# Patient Record
Sex: MALE | Race: WHITE
[De-identification: names, ages, dates, MRNs, and addresses within clinical notes are randomized per-mention and may not be internally consistent; named-entity substitution may affect disease eponyms.]

---

## 2018-05-07 ENCOUNTER — HOSPITAL ENCOUNTER (EMERGENCY)
Dept: HOSPITAL 17 - NEPE | Age: 68
LOS: 1 days | Discharge: HOME | End: 2018-05-08
Payer: COMMERCIAL

## 2018-05-07 VITALS — OXYGEN SATURATION: 96 % | RESPIRATION RATE: 18 BRPM | HEART RATE: 69 BPM

## 2018-05-07 VITALS
SYSTOLIC BLOOD PRESSURE: 222 MMHG | DIASTOLIC BLOOD PRESSURE: 102 MMHG | RESPIRATION RATE: 18 BRPM | TEMPERATURE: 98.3 F | HEART RATE: 72 BPM | OXYGEN SATURATION: 97 %

## 2018-05-07 VITALS — BODY MASS INDEX: 36.14 KG/M2 | WEIGHT: 224.87 LBS | HEIGHT: 66 IN

## 2018-05-07 DIAGNOSIS — Z79.899: ICD-10-CM

## 2018-05-07 DIAGNOSIS — E11.9: ICD-10-CM

## 2018-05-07 DIAGNOSIS — I48.91: ICD-10-CM

## 2018-05-07 DIAGNOSIS — E78.5: ICD-10-CM

## 2018-05-07 DIAGNOSIS — Z79.01: ICD-10-CM

## 2018-05-07 DIAGNOSIS — T38.3X1A: Primary | ICD-10-CM

## 2018-05-07 DIAGNOSIS — Z79.4: ICD-10-CM

## 2018-05-07 DIAGNOSIS — E78.00: ICD-10-CM

## 2018-05-07 DIAGNOSIS — E03.9: ICD-10-CM

## 2018-05-07 DIAGNOSIS — I10: ICD-10-CM

## 2018-05-07 LAB
BASOPHILS # BLD AUTO: 0 TH/MM3 (ref 0–0.2)
BASOPHILS NFR BLD: 0.8 % (ref 0–2)
EOSINOPHIL # BLD: 0.4 TH/MM3 (ref 0–0.4)
EOSINOPHIL NFR BLD: 6.3 % (ref 0–4)
ERYTHROCYTE [DISTWIDTH] IN BLOOD BY AUTOMATED COUNT: 15 % (ref 11.6–17.2)
HCT VFR BLD CALC: 42.4 % (ref 39–51)
HGB BLD-MCNC: 14.5 GM/DL (ref 13–17)
LYMPHOCYTES # BLD AUTO: 1.5 TH/MM3 (ref 1–4.8)
LYMPHOCYTES NFR BLD AUTO: 24.5 % (ref 9–44)
MCH RBC QN AUTO: 32.8 PG (ref 27–34)
MCHC RBC AUTO-ENTMCNC: 34.3 % (ref 32–36)
MCV RBC AUTO: 95.7 FL (ref 80–100)
MONOCYTE #: 0.6 TH/MM3 (ref 0–0.9)
MONOCYTES NFR BLD: 9.9 % (ref 0–8)
NEUTROPHILS # BLD AUTO: 3.5 TH/MM3 (ref 1.8–7.7)
NEUTROPHILS NFR BLD AUTO: 58.5 % (ref 16–70)
PLATELET # BLD: 168 TH/MM3 (ref 150–450)
PMV BLD AUTO: 10.6 FL (ref 7–11)
RBC # BLD AUTO: 4.43 MIL/MM3 (ref 4.5–5.9)
WBC # BLD AUTO: 6 TH/MM3 (ref 4–11)

## 2018-05-07 PROCEDURE — 99283 EMERGENCY DEPT VISIT LOW MDM: CPT

## 2018-05-07 PROCEDURE — 85025 COMPLETE CBC W/AUTO DIFF WBC: CPT

## 2018-05-07 PROCEDURE — 85610 PROTHROMBIN TIME: CPT

## 2018-05-07 PROCEDURE — 80048 BASIC METABOLIC PNL TOTAL CA: CPT

## 2018-05-07 NOTE — PD
HPI


Chief Complaint:  Diabetic


Time Seen by Provider:  23:20


Travel History


International Travel<30 days:  No


Contact w/Intl Traveler<30days:  No


Traveled to known affect area:  No





History of Present Illness


HPI


The patient is a 67 year old male who presents to the [facility] emergency 

department with a history of accidentally administering extra NovoLog this 

evening around 10 PM.  The patient reports that he last ate a meal for dinner 

at approximately 6:30 PM.  The patient reports that he was preparing to go to 

bed and took his nighttime oral medicines and was also in the process of 

administering his NovoLog and Lantus insulin.  He normally administers 45 units 

of Lantus at night and 9 units of NovoLog 3 times daily.  He reports that he 

mixed up the 2 bottles and accidentally administered 45 units of NovoLog.  The 

patient reports that his blood sugar then dropped down to 48.  He reports that 

he drank orange juice, 8 jellybeans, and on arrival to this facility was also 

provided a meal.  Since then his blood sugar has been maintained.  The patient 

denies having any other acute complaints.  The patient is anticoagulated on 

Coumadin for atrial fibrillation.  He denies having any problems with bleeding.

  His last INR was checked last week.  He reports that his dose was decreased, 

however he is unsure with INR was.  On review of systems otherwise he denies 

having any recent fevers, cough or congestion, neck pain, chest pain, shortness 

of breath, abdominal pain, vomiting, diarrhea, urinary symptoms, or neurologic 

symptoms.  The patient reports that when his blood sugar drops he does get 

jittery and sweaty.





[facility]


Past Medical History


*** Narrative Medical


The patient's past medical history is significant for diabetes mellitus, 

hypertension, hypothyroid disorder, hyperlipidemia, atrial fibrillation.


High Cholesterol:  Yes


Diabetes:  Yes


Patient Takes Glucophage:  No


Hypertension:  Yes





Past Surgical History


*** Narrative Surgical


The patient's past surgical history is significant for right third finger 

surgery, lumbar laminectomy, tonsillectomy, cholecystectomy.


Cholecystectomy:  Yes


Tonsillectomy:  Yes


Other Surgery:  Yes





Social History


Alcohol Use:  No


Tobacco Use:  No


Substance Use:  No





Allergies-Medications


(Allergen,Severity, Reaction):  


Coded Allergies:  


     No Known Allergies (Unverified  Adverse Reaction, Unknown, 5/7/18)


Reported Meds & Prescriptions





Reported Meds & Active Scripts


Active


Reported


Metoprolol Tartrate 50 Mg Tab 50 Mg PO BID


Fenofibrate 160 Mg Tab 160 Mg PO HS


Amlodipine (Amlodipine Besylate) 5 Mg Tab 5 Mg PO DAILY


Warfarin 5 Mg Tab 5 Mg PO DAILY


Rosuvastatin (Rosuvastatin Calcium) 20 Mg Tab 20 Mg PO HS


Paroxetine (Paroxetine HCl) 30 Mg Tab 30 Mg PO DAILY


Clonidine (Clonidine HCl) 0.1 Mg Tab 0.1 Mg PO BID


Levothyroxine (Levothyroxine Sodium) 175 Mcg Tab 175 Mcg PO DAILY


Losartan-Hydrochlorothiazide 100-25 Mg Tab 1 Tab PO DAILY


Novolog Inj (Insulin Aspart) 1,000 Unit/10 Ml Vial 9 Units SQ TIDAC


Lantus Inj (Insulin Glargine) 1,000 Unit/10 Ml Vial 45 Units SQ HS








Review of Systems


Except as stated in HPI:  all other systems reviewed are Neg


General / Constitutional:  No: Fever


Eyes:  No: Visual changes


HENT:  No: Headaches


Cardiovascular:  No: Chest Pain or Discomfort


Respiratory:  No: Shortness of Breath


Gastrointestinal:  No: Abdominal Pain


Genitourinary:  No: Dysuria


Musculoskeletal:  No: Pain


Skin:  No Rash


Neurologic:  No: Weakness


Psychiatric:  No: Depression


Endocrine:  No: Polydipsia


Hematologic/Lymphatic:  No: Easy Bruising





Physical Exam


Narrative


General: 


The patient is a well-developed well-nourished male in no acute distress. 





Head and Neck exam: 


Head is normocephalic atraumatic. 


Eyes: EOMI, pupils are equal round and reactive to light. 


Nose: Midline septum with pink mucous membranes 


Mouth: Dentition unremarkable. Moist mucus membranes. Posterior oropharynx is 

not erythematous. No tonsillar hypertrophy. Uvula midline. Airway patent. 


Neck: No palpable lymphadenopathy. No nuchal rigidity. No thyromegaly. 





Cardiovascular: 


Irregularly irregular with rate control in the 70s without murmurs, gallops, or 

rubs. 





Lungs: 


Clear to auscultation bilaterally. No wheezes, rhonchi, or rales.


 


Abdomen:


Soft, without tenderness to palpation in all 4 quadrants of the abdomen. No 

guarding, rebound, or rigidity.  Normal bowel sounds are audible.  No 

tenderness on palpation of McBurney's point.





Extremities: 


No clubbing, cyanosis, or edema. 2+ pulses in all 4 extremities.  No calf 

tenderness on palpation peer





Back: 


 No costovertebral angle tenderness to palpation. 





Neurologic Exam: Grossly nonfocal.





Skin Exam: No rash noted. Intact skin that is warm and dry.





Data


Data


Last Documented VS





Vital Signs








  Date Time  Temp Pulse Resp B/P (MAP) Pulse Ox O2 Delivery O2 Flow Rate FiO2


 


5/8/18 03:00  55 18 130/75 (93) 96 Room Air  


 


5/7/18 23:08 98.3       








Orders





 Orders


Complete Blood Count With Diff (5/7/18 23:20)


Basic Metabolic Panel (Bmp) (5/7/18 23:20)


Iv Access Insert/Monitor (5/7/18 23:20)


Ecg Monitoring (5/7/18 23:20)


Oximetry (5/7/18 23:20)


Diet As Tolerated (5/7/18 23:20)


Bedside Glucose Q15M (5/7/18 23:20)


Hypoglycemia 70 Mg/Dl Or < (5/7/18 23:20)


Dextrose 50% In Marquez (Vial) Inj (D50w (Vi (5/7/18 23:30)


Glucagon Inj (Glucagon Inj) (5/7/18 23:30)


Glucose, Random (5/7/18 23:20)


Prothrombin Time / Inr (Pt) (5/8/18 00:15)


Bedside Glucose FLOR.CSUGAR (5/8/18 03:13)





Labs





Laboratory Tests








Test


  5/7/18


23:40 5/8/18


00:10


 


White Blood Count 6.0 TH/MM3  


 


Red Blood Count 4.43 MIL/MM3  


 


Hemoglobin 14.5 GM/DL  


 


Hematocrit 42.4 %  


 


Mean Corpuscular Volume 95.7 FL  


 


Mean Corpuscular Hemoglobin 32.8 PG  


 


Mean Corpuscular Hemoglobin


Concent 34.3 % 


  


 


 


Red Cell Distribution Width 15.0 %  


 


Platelet Count 168 TH/MM3  


 


Mean Platelet Volume 10.6 FL  


 


Neutrophils (%) (Auto) 58.5 %  


 


Lymphocytes (%) (Auto) 24.5 %  


 


Monocytes (%) (Auto) 9.9 %  


 


Eosinophils (%) (Auto) 6.3 %  


 


Basophils (%) (Auto) 0.8 %  


 


Neutrophils # (Auto) 3.5 TH/MM3  


 


Lymphocytes # (Auto) 1.5 TH/MM3  


 


Monocytes # (Auto) 0.6 TH/MM3  


 


Eosinophils # (Auto) 0.4 TH/MM3  


 


Basophils # (Auto) 0.0 TH/MM3  


 


CBC Comment DIFF FINAL  


 


Differential Comment   


 


Blood Urea Nitrogen 32 MG/DL  


 


Creatinine 2.26 MG/DL  


 


Random Glucose 176 MG/DL  


 


Calcium Level 8.6 MG/DL  


 


Sodium Level 138 MEQ/L  


 


Potassium Level 3.6 MEQ/L  


 


Chloride Level 101 MEQ/L  


 


Carbon Dioxide Level 29.7 MEQ/L  


 


Anion Gap 7 MEQ/L  


 


Estimat Glomerular Filtration


Rate 29 ML/MIN 


  


 


 


Prothrombin Time  21.2 SEC 


 


Prothromb Time International


Ratio 


  2.1 RATIO 


 











MDM


Medical Decision Making


Medical Screen Exam Complete:  Yes


Emergency Medical Condition:  Yes


Medical Record Reviewed:  Yes


Differential Diagnosis


Intentional medication overdose, versus accidental medication overdose


Narrative Course


During the course of the patient's emergency department visit, the patient's 

history, examination, and differential diagnosis were reviewed with the 

patient. The patient was placed on a cardiac monitor with oximetry and frequent 

blood pressure monitoring. The patient had  IV access obtained and blood work 

sent for analysis. 





The patient was initially provided a meal to further support his blood sugar.





The patient's laboratory studies were reviewed and remarkable for a white count 

of 6, hemoglobin 14.5, platelets 168 with 9.9 monocytes, basic metabolic 

profile is remarkable for BUN of 32, creatinine 2.26, glucose 176, calcium 8.6, 

PT 21.2, INR 2.1, according to the record the patient does have a history of 

chronic renal insufficiency.  The patient's last creatinine documented by his 

primary care physician was 1.71 in 2016





The patient was monitored in the emergency department.  The patient over the 

next several hours had repeat blood sugars drawn that range from 180-148.  The 

patient remained asymptomatic.  The patient will be discharged home with close 

monitoring of his blood sugar.  The patient plans to separate his insulin and 2 

separate areas to avoid taking extra of the wrong insulin in the future.





The patient is resting comfortably and feels better, is alert and in no 

distress. The patient's results and examination findings were discussed with 

the patient. The repeat examination is unremarkable and benign. The history, 

exam, diagnostic testing, and current condition do not suggest any significant 

pathology to warrant further testing, continued ED treatment, admission, or 

surgical evaluation at this point. The vital signs have been stable. The 

patient does not have uncontrollable pain, intractable vomiting, or other 

significant symptoms. The patient's condition is stable and appropriate for 

discharge. The patient will pursue further outpatient evaluation with a primary 

care physician or other designated or consulting physician as indicated in the 

discharge instructions. The patient expressed understanding and was agreeable 

with this plan.





Diagnosis





 Primary Impression:  


 Accidental medication error


 Qualified Codes:  T50.901A - Poisoning by unspecified drugs, medicaments and 

biological substances, accidental (unintentional), initial encounter


Referrals:  


Primary Care Physician


3 days


Patient Instructions:  General Instructions, Hypoglycemia in a Person with 

Diabetes (ED)


Disposition:  01 DISCHARGE HOME


Condition:  Stable











Gillian Peck MD May 7, 2018 23:49

## 2018-05-08 VITALS
DIASTOLIC BLOOD PRESSURE: 89 MMHG | HEART RATE: 71 BPM | OXYGEN SATURATION: 96 % | SYSTOLIC BLOOD PRESSURE: 174 MMHG | RESPIRATION RATE: 18 BRPM

## 2018-05-08 VITALS
SYSTOLIC BLOOD PRESSURE: 125 MMHG | OXYGEN SATURATION: 95 % | HEART RATE: 59 BPM | DIASTOLIC BLOOD PRESSURE: 70 MMHG | RESPIRATION RATE: 18 BRPM

## 2018-05-08 VITALS
DIASTOLIC BLOOD PRESSURE: 75 MMHG | RESPIRATION RATE: 18 BRPM | HEART RATE: 55 BPM | SYSTOLIC BLOOD PRESSURE: 130 MMHG | OXYGEN SATURATION: 96 %

## 2018-05-08 LAB
BUN SERPL-MCNC: 32 MG/DL (ref 7–18)
CALCIUM SERPL-MCNC: 8.6 MG/DL (ref 8.5–10.1)
CHLORIDE SERPL-SCNC: 101 MEQ/L (ref 98–107)
CREAT SERPL-MCNC: 2.26 MG/DL (ref 0.6–1.3)
GFR SERPLBLD BASED ON 1.73 SQ M-ARVRAT: 29 ML/MIN (ref 89–?)
GLUCOSE SERPL-MCNC: 176 MG/DL (ref 74–106)
HCO3 BLD-SCNC: 29.7 MEQ/L (ref 21–32)
INR PPP: 2.1 RATIO
PROTHROMBIN TIME: 21.2 SEC (ref 9.8–11.6)
SODIUM SERPL-SCNC: 138 MEQ/L (ref 136–145)